# Patient Record
Sex: FEMALE | Race: ASIAN | NOT HISPANIC OR LATINO
[De-identification: names, ages, dates, MRNs, and addresses within clinical notes are randomized per-mention and may not be internally consistent; named-entity substitution may affect disease eponyms.]

---

## 2019-06-17 ENCOUNTER — FORM ENCOUNTER (OUTPATIENT)
Age: 59
End: 2019-06-17

## 2019-09-09 ENCOUNTER — FORM ENCOUNTER (OUTPATIENT)
Age: 59
End: 2019-09-09

## 2019-12-17 ENCOUNTER — FORM ENCOUNTER (OUTPATIENT)
Age: 59
End: 2019-12-17

## 2020-12-01 ENCOUNTER — FORM ENCOUNTER (OUTPATIENT)
Age: 60
End: 2020-12-01

## 2021-11-29 PROBLEM — Z00.00 ENCOUNTER FOR PREVENTIVE HEALTH EXAMINATION: Status: ACTIVE | Noted: 2021-11-29

## 2021-11-30 DIAGNOSIS — Z78.9 OTHER SPECIFIED HEALTH STATUS: ICD-10-CM

## 2021-12-06 ENCOUNTER — APPOINTMENT (OUTPATIENT)
Dept: BREAST CENTER | Facility: CLINIC | Age: 61
End: 2021-12-06
Payer: COMMERCIAL

## 2021-12-06 ENCOUNTER — RESULT REVIEW (OUTPATIENT)
Age: 61
End: 2021-12-06

## 2021-12-06 VITALS — HEIGHT: 62 IN | BODY MASS INDEX: 18.95 KG/M2 | WEIGHT: 103 LBS

## 2021-12-06 DIAGNOSIS — Z87.39 PERSONAL HISTORY OF OTHER DISEASES OF THE MUSCULOSKELETAL SYSTEM AND CONNECTIVE TISSUE: ICD-10-CM

## 2021-12-06 DIAGNOSIS — Z87.19 PERSONAL HISTORY OF OTHER DISEASES OF THE DIGESTIVE SYSTEM: ICD-10-CM

## 2021-12-06 DIAGNOSIS — Z80.0 FAMILY HISTORY OF MALIGNANT NEOPLASM OF DIGESTIVE ORGANS: ICD-10-CM

## 2021-12-06 DIAGNOSIS — Z87.898 PERSONAL HISTORY OF OTHER SPECIFIED CONDITIONS: ICD-10-CM

## 2021-12-06 PROCEDURE — 99213 OFFICE O/P EST LOW 20 MIN: CPT

## 2021-12-06 PROCEDURE — 99072 ADDL SUPL MATRL&STAF TM PHE: CPT

## 2021-12-06 RX ORDER — DENOSUMAB 60 MG/ML
INJECTION SUBCUTANEOUS
Refills: 0 | Status: ACTIVE | COMMUNITY

## 2021-12-06 RX ORDER — METFORMIN HYDROCHLORIDE 625 MG/1
TABLET ORAL
Refills: 0 | Status: ACTIVE | COMMUNITY

## 2021-12-06 NOTE — ASSESSMENT
[FreeTextEntry1] : The patient is a 61-year-old G1, P1 postmenopausal female of [place]/ descent.  She never took any hormone replacement therapy after menopause.  She has no family history of breast or ovarian cancer.  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  MRI showed the cancer be localized and she underwent a left breast partial mastectomy and sentinel lymph node biopsy on November 13, 2014 and the final pathology showed an 8 mm poorly differentiated invasive duct cancer without lymphovascular invasion and 3 negative sentinel lymph nodes with free margins making this a pathologic prognostic stage IB breast cancer.  She underwent chemotherapy with Dr. Mac in [place].  She finished chemotherapy in February 18, 2015 and underwent radiation therapy through Dr. Silva and underwent 33 treatments which finished on May 7, 2015.  She underwent comprehensive BRCA testing in November 2014 which was negative and then later underwent [facility] genetic panel testing in August 2019 which was negative.  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  The patient underwent her last bilateral mammography and ultrasound which was reviewed from today here at [place] on December 6, 2021 which showed no suspicious findings but I am awaiting the official radiologist report.  On exam today, I cannot feel any suspicious densities in either breast.   The patient was reassured and should continue yearly follow-up again in December 2022.  Her next bilateral mammography and ultrasound will be due at that time and she was given prescriptions.

## 2021-12-06 NOTE — PAST MEDICAL HISTORY
[Postmenopausal] : The patient is postmenopausal [Total Preg ___] : G[unfilled] [Live Births ___] : P[unfilled]  [Menarche Age ____] : age at menarche was [unfilled] [Definite ___ (Date)] : the last menstrual period was [unfilled] [Age At Live Birth ___] : Age at live birth: [unfilled] [History of Hormone Replacement Treatment] : has no history of hormone replacement treatment

## 2021-12-06 NOTE — HISTORY OF PRESENT ILLNESS
[FreeTextEntry1] : The patient is a 61-year-old G1, P1 postmenopausal female of [place]/ descent.  She never took any hormone replacement therapy after menopause.  She has no family history of breast or ovarian cancer.  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  MRI showed the cancer be localized and she underwent a left breast partial mastectomy and sentinel lymph node biopsy on November 13, 2014 and the final pathology showed an 8 mm poorly differentiated invasive duct cancer without lymphovascular invasion and 3 negative sentinel lymph nodes with free margins making this a pathologic prognostic stage IB breast cancer.  She underwent chemotherapy with Dr. Mac in [place].  She finished chemotherapy in February 18, 2015 and underwent radiation therapy through Dr. Silva and underwent 33 treatments which finished on May 7, 2015.  She underwent comprehensive BRCA testing in November 2014 which was negative and then later underwent [facility] genetic panel testing in August 2019 which was negative.  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  She comes in for routine yearly follow-up and gets yearly mammography and ultrasound.

## 2021-12-06 NOTE — REASON FOR VISIT
[Follow-Up: _____] : a [unfilled] follow-up visit [FreeTextEntry1] : The patient comes in with a history of undergoing a left breast partial mastectomy sentinel lymph node biopsy in November 2014 for a pathologic prognostic stage IB, 8 mm poorly differentiated invasive duct cancer with 3 negative  sentinel lymph nodes which was triple negative.  She underwent chemotherapy with Dr. Mac and then underwent radiation therapy.  Later left breast ultrasound core biopsy in December 2015 was benign.  [facility] genetic panel testing was negative and she comes in for routine yearly follow-up and continues to get yearly mammography and ultrasound.

## 2021-12-06 NOTE — PHYSICAL EXAM
[Normocephalic] : normocephalic [Atraumatic] : atraumatic [EOMI] : extra ocular movement intact [Supple] : supple [No Supraclavicular Adenopathy] : no supraclavicular adenopathy [No Cervical Adenopathy] : no cervical adenopathy [Examined in the supine and seated position] : examined in the supine and seated position [No dominant masses] : no dominant masses in right breast  [No dominant masses] : no dominant masses left breast [No Nipple Retraction] : no left nipple retraction [No Nipple Discharge] : no left nipple discharge [Breast Mass Right Breast ___cm] : no masses [Breast Mass Left Breast ___cm] : no masses [Breast Nipple Inversion] : nipples not inverted [Breast Nipple Retraction] : nipples not retracted [Breast Nipple Flattening] : nipples not flattened [Breast Nipple Fissures] : nipples not fissured [Breast Abnormal Lactation (Galactorrhea)] : no galactorrhea [Breast Abnormal Secretion Bloody Fluid] : no bloody discharge [Breast Abnormal Secretion Serous Fluid] : no serous discharge [Breast Abnormal Secretion Opalescent Fluid] : no milky discharge [No Axillary Lymphadenopathy] : no left axillary lymphadenopathy [No Edema] : no edema [No Rashes] : no rashes [No Ulceration] : no ulceration [de-identified] : On exam today, the patient has symmetrical A-cup breasts.  She has an excellent cosmetic result from her left breast partial mastectomy with no evidence of recurrence.  She has no axillary, supraclavicular, or cervical adenopathy.

## 2021-12-08 ENCOUNTER — APPOINTMENT (OUTPATIENT)
Dept: BREAST CENTER | Facility: CLINIC | Age: 61
End: 2021-12-08

## 2021-12-17 ENCOUNTER — NON-APPOINTMENT (OUTPATIENT)
Age: 61
End: 2021-12-17

## 2022-01-05 ENCOUNTER — NON-APPOINTMENT (OUTPATIENT)
Age: 62
End: 2022-01-05

## 2022-03-09 NOTE — PAST MEDICAL HISTORY
[Postmenopausal] : The patient is postmenopausal [Total Preg ___] : G[unfilled] [Live Births ___] : P[unfilled]  [History of Hormone Replacement Treatment] : has no history of hormone replacement treatment

## 2022-03-09 NOTE — ASSESSMENT
[FreeTextEntry1] : The patient is a 61-year-old G1, P1 postmenopausal female of Persian/ descent.  She never took any hormone replacement therapy after menopause.  She has no family history of breast or ovarian cancer.  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  MRI showed the cancer be localized and she underwent a left breast partial mastectomy and sentinel lymph node biopsy on November 13, 2014 and the final pathology showed an 8 mm poorly differentiated invasive duct cancer without lymphovascular invasion and 3 negative sentinel lymph nodes with free margins making this a pathologic prognostic stage IB breast cancer.  She underwent chemotherapy with Dr. Mac in [place].  She finished chemotherapy in February 18, 2015 and underwent radiation therapy through Dr. Silva and underwent 33 treatments which finished on May 7, 2015.  She underwent comprehensive BRCA testing in November 2014 which was negative and then later underwent [facility] genetic panel testing in August 2019 which was negative.  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  The patient underwent her last bilateral mammography and ultrasound which was reviewed from ????.  On exam today, ......   The patient was reassured and should continue yearly follow-up again in December 2022.  Her next bilateral mammography and ultrasound will be due on ?????  and she was given prescriptions.

## 2022-03-09 NOTE — REASON FOR VISIT
[Follow-Up: _____] : a [unfilled] follow-up visit [FreeTextEntry1] : The patient comes in with a history of undergoing a left breast partial mastectomy sentinel lymph node biopsy in November 2014 for a pathologic prognostic stage IB, 8 mm poorly differentiated invasive duct cancer with 3 negative  sentinel lymph nodes which was triple negative.  She underwent chemotherapy with Dr. Mac and then underwent radiation therapy.  Later left breast ultrasound core biopsy in December 2015 was benign.  [facility] genetic panel testing was negative and she comes in for routine yearly follow-up and continues to get yearly mammography and ultrasound.

## 2022-03-09 NOTE — HISTORY OF PRESENT ILLNESS
[FreeTextEntry1] : The patient is a 61-year-old G1, P1 postmenopausal female of [place]/ descent.  She never took any hormone replacement therapy after menopause.  She has no family history of breast or ovarian cancer.  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  MRI showed the cancer be localized and she underwent a left breast partial mastectomy and sentinel lymph node biopsy on November 13, 2014 and the final pathology showed an 8 mm poorly differentiated invasive duct cancer without lymphovascular invasion and 3 negative sentinel lymph nodes with free margins making this a pathologic prognostic stage IB breast cancer.  She underwent chemotherapy with Dr. Mac in [place].  She finished chemotherapy in February 18, 2015 and underwent radiation therapy through Dr. Silva and underwent 33 treatments which finished on May 7, 2015.  She underwent comprehensive BRCA testing in November 2014 which was negative and then later underwent [facility] genetic panel testing in August 2019 which was negative.  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  She comes in for routine yearly follow-up and gets yearly mammography and ultrasound.

## 2022-04-27 ENCOUNTER — NON-APPOINTMENT (OUTPATIENT)
Age: 62
End: 2022-04-27

## 2022-12-07 ENCOUNTER — RESULT REVIEW (OUTPATIENT)
Age: 62
End: 2022-12-07

## 2022-12-08 DIAGNOSIS — R92.2 INCONCLUSIVE MAMMOGRAM: ICD-10-CM

## 2022-12-09 ENCOUNTER — APPOINTMENT (OUTPATIENT)
Dept: BREAST CENTER | Facility: CLINIC | Age: 62
End: 2022-12-09

## 2022-12-09 VITALS
TEMPERATURE: 97.5 F | OXYGEN SATURATION: 99 % | HEART RATE: 85 BPM | BODY MASS INDEX: 19.32 KG/M2 | HEIGHT: 62 IN | WEIGHT: 105 LBS

## 2022-12-09 PROCEDURE — 99213 OFFICE O/P EST LOW 20 MIN: CPT

## 2022-12-09 NOTE — HISTORY OF PRESENT ILLNESS
[FreeTextEntry1] : The patient is a 62-year-old G1, P1 postmenopausal female of Korean/ descent.  She never took any hormone replacement therapy after menopause.  She has no family history of breast or ovarian cancer.  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  MRI showed the cancer be localized and she underwent a left breast partial mastectomy and sentinel lymph node biopsy on November 13, 2014 and the final pathology showed an 8 mm poorly differentiated invasive duct cancer without lymphovascular invasion and 3 negative sentinel lymph nodes with free margins making this a pathologic prognostic stage IB breast cancer.  She underwent chemotherapy with Dr. Mac in [place].  She finished chemotherapy in February 18, 2015 and underwent radiation therapy through Dr. Silva and underwent 33 treatments which finished on May 7, 2015.  She underwent comprehensive BRCA testing in November 2014 which was negative and then later underwent [facility] genetic panel testing in August 2019 which was negative.  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  She comes in for routine yearly follow-up and gets yearly mammography and ultrasound.

## 2022-12-09 NOTE — PAST MEDICAL HISTORY
[Postmenopausal] : The patient is postmenopausal [Menarche Age ____] : age at menarche was [unfilled] [Definite ___ (Date)] : the last menstrual period was [unfilled] [Total Preg ___] : G[unfilled] [Live Births ___] : P[unfilled]  [Age At Live Birth ___] : Age at live birth: [unfilled] [History of Hormone Replacement Treatment] : has no history of hormone replacement treatment

## 2022-12-09 NOTE — PHYSICAL EXAM
[Normocephalic] : normocephalic [Atraumatic] : atraumatic [EOMI] : extra ocular movement intact [Supple] : supple [No Supraclavicular Adenopathy] : no supraclavicular adenopathy [No Cervical Adenopathy] : no cervical adenopathy [Examined in the supine and seated position] : examined in the supine and seated position [No dominant masses] : no dominant masses in right breast  [No dominant masses] : no dominant masses left breast [No Nipple Retraction] : no left nipple retraction [No Nipple Discharge] : no left nipple discharge [Breast Mass Right Breast ___cm] : no masses [Breast Mass Left Breast ___cm] : no masses [No Axillary Lymphadenopathy] : no left axillary lymphadenopathy [No Edema] : no edema [No Rashes] : no rashes [No Ulceration] : no ulceration [Breast Nipple Inversion] : nipples not inverted [Breast Nipple Retraction] : nipples not retracted [Breast Nipple Flattening] : nipples not flattened [Breast Nipple Fissures] : nipples not fissured [Breast Abnormal Lactation (Galactorrhea)] : no galactorrhea [Breast Abnormal Secretion Bloody Fluid] : no bloody discharge [Breast Abnormal Secretion Serous Fluid] : no serous discharge [Breast Abnormal Secretion Opalescent Fluid] : no milky discharge [de-identified] : On exam today, the patient has symmetrical A-cup breasts.  She has an excellent cosmetic result from her left breast partial mastectomy with no evidence of recurrence.  She has no axillary, supraclavicular, or cervical adenopathy.

## 2022-12-09 NOTE — REASON FOR VISIT
[Follow-Up: _____] : a [unfilled] follow-up visit [FreeTextEntry1] : The patient comes in with a history of undergoing a left breast partial mastectomy sentinel lymph node biopsy in November 2014 for a pathologic prognostic stage IB, 8 mm poorly differentiated invasive duct cancer with 3 negative  sentinel lymph nodes which was triple negative.  She underwent chemotherapy with Dr. Mac and then underwent radiation therapy.  Later left breast ultrasound core biopsy in December 2015 was benign.  [facility] genetic panel testing was negative and she comes in for routine yearly follow-up and continues to get yearly mammography and ultrasound.

## 2022-12-09 NOTE — ASSESSMENT
[FreeTextEntry1] : The patient is a 62-year-old G1, P1 postmenopausal female of Fr[place]/ descent.  She never took any hormone replacement therapy after menopause.  She has no family history of breast or ovarian cancer.  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  MRI showed the cancer be localized and she underwent a left breast partial mastectomy and sentinel lymph node biopsy on November 13, 2014 and the final pathology showed an 8 mm poorly differentiated invasive duct cancer without lymphovascular invasion and 3 negative sentinel lymph nodes with free margins making this a pathologic prognostic stage IB breast cancer.  She underwent chemotherapy with Dr. Mac in [place].  She finished chemotherapy in February 18, 2015 and underwent radiation therapy through Dr. Silva and underwent 33 treatments which finished on May 7, 2015.  She underwent comprehensive BRCA testing in November 2014 which was negative and then later underwent [facility] genetic panel testing in August 2019 which was negative.  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  The patient underwent her last bilateral mammography and ultrasound which was reviewed from today here at [place] on December 7, 2022 which showed no suspicious findings.  She did undergo a bilateral breast MRI at [facility] back on April 25, 2022 which showed no suspicious findings.  On exam today, I cannot feel any suspicious densities in either breast.   The patient was reassured and should continue yearly follow-up again in December 2023.  Her next bilateral mammography and ultrasound will be due in December 2023 and she was given prescriptions.  I do not think she continues to require routine screening MRIs.

## 2023-11-28 ENCOUNTER — NON-APPOINTMENT (OUTPATIENT)
Age: 63
End: 2023-11-28

## 2023-12-06 ENCOUNTER — NON-APPOINTMENT (OUTPATIENT)
Age: 63
End: 2023-12-06

## 2023-12-08 ENCOUNTER — RESULT REVIEW (OUTPATIENT)
Age: 63
End: 2023-12-08

## 2023-12-08 ENCOUNTER — APPOINTMENT (OUTPATIENT)
Dept: BREAST CENTER | Facility: CLINIC | Age: 63
End: 2023-12-08
Payer: COMMERCIAL

## 2023-12-08 VITALS
OXYGEN SATURATION: 99 % | HEART RATE: 78 BPM | SYSTOLIC BLOOD PRESSURE: 107 MMHG | TEMPERATURE: 97.3 F | HEIGHT: 62 IN | DIASTOLIC BLOOD PRESSURE: 75 MMHG | BODY MASS INDEX: 18.95 KG/M2 | WEIGHT: 103 LBS

## 2023-12-08 DIAGNOSIS — Z85.3 PERSONAL HISTORY OF MALIGNANT NEOPLASM OF BREAST: ICD-10-CM

## 2023-12-08 DIAGNOSIS — Z90.12 ACQUIRED ABSENCE OF LEFT BREAST AND NIPPLE: ICD-10-CM

## 2023-12-08 PROCEDURE — 99213 OFFICE O/P EST LOW 20 MIN: CPT

## 2024-03-27 ENCOUNTER — NON-APPOINTMENT (OUTPATIENT)
Age: 64
End: 2024-03-27

## 2024-12-16 ENCOUNTER — NON-APPOINTMENT (OUTPATIENT)
Age: 64
End: 2024-12-16

## 2025-01-06 ENCOUNTER — APPOINTMENT (OUTPATIENT)
Dept: BREAST CENTER | Facility: CLINIC | Age: 65
End: 2025-01-06
Payer: COMMERCIAL

## 2025-01-06 VITALS — WEIGHT: 103 LBS | BODY MASS INDEX: 18.95 KG/M2 | HEIGHT: 62 IN

## 2025-01-06 DIAGNOSIS — R92.30 DENSE BREASTS, UNSPECIFIED: ICD-10-CM

## 2025-01-06 DIAGNOSIS — Z90.12 ACQUIRED ABSENCE OF LEFT BREAST AND NIPPLE: ICD-10-CM

## 2025-01-06 DIAGNOSIS — Z85.3 PERSONAL HISTORY OF MALIGNANT NEOPLASM OF BREAST: ICD-10-CM

## 2025-01-06 DIAGNOSIS — Z12.39 ENCOUNTER FOR OTHER SCREENING FOR MALIGNANT NEOPLASM OF BREAST: ICD-10-CM

## 2025-01-06 PROCEDURE — 99213 OFFICE O/P EST LOW 20 MIN: CPT
